# Patient Record
Sex: FEMALE | Race: WHITE | NOT HISPANIC OR LATINO | Employment: FULL TIME | URBAN - METROPOLITAN AREA
[De-identification: names, ages, dates, MRNs, and addresses within clinical notes are randomized per-mention and may not be internally consistent; named-entity substitution may affect disease eponyms.]

---

## 2017-10-25 ENCOUNTER — OFFICE VISIT (OUTPATIENT)
Dept: MEDICAL GROUP | Facility: MEDICAL CENTER | Age: 52
End: 2017-10-25
Payer: COMMERCIAL

## 2017-10-25 VITALS
BODY MASS INDEX: 27.21 KG/M2 | OXYGEN SATURATION: 98 % | DIASTOLIC BLOOD PRESSURE: 90 MMHG | TEMPERATURE: 98.2 F | RESPIRATION RATE: 14 BRPM | HEART RATE: 86 BPM | SYSTOLIC BLOOD PRESSURE: 130 MMHG | WEIGHT: 159.39 LBS | HEIGHT: 64 IN

## 2017-10-25 DIAGNOSIS — E66.3 OVERWEIGHT (BMI 25.0-29.9): ICD-10-CM

## 2017-10-25 DIAGNOSIS — R03.0 ELEVATED BP WITHOUT DIAGNOSIS OF HYPERTENSION: ICD-10-CM

## 2017-10-25 DIAGNOSIS — Z78.0 MENOPAUSE PRESENT: ICD-10-CM

## 2017-10-25 PROCEDURE — 99203 OFFICE O/P NEW LOW 30 MIN: CPT | Performed by: FAMILY MEDICINE

## 2017-10-25 ASSESSMENT — PATIENT HEALTH QUESTIONNAIRE - PHQ9: CLINICAL INTERPRETATION OF PHQ2 SCORE: 0

## 2017-10-25 NOTE — ASSESSMENT & PLAN NOTE
This is a chronic issue that has shown up more over the last year. Patient's blood pressure is the upper limits of normal at 130/90. She states that there is times where her readings are normal when she takes them out of the store and other times where they are in the similar range as what they are today. This is all possibly related to the weight gain. We will continue to monitor for now. We are doing some blood tests on her as well.

## 2017-10-25 NOTE — PROGRESS NOTES
CC: Establish care and discuss problems listed below    HISTORY OF THE PRESENT ILLNESS: Patient is a 52 y.o. female. This pleasant patient is here today to establish care and discuss chronic health problems listed below.    Health Maintenance: Patient is overdue for Pap and mammogram we will get those records from her previous provider in [place]. She does state that she had a colonoscopy in 2008.      Overweight (BMI 25.0-29.9)  This is been a chronic problem for this patient that started approximately 2 years ago. This patient had one ovary removed when she was quite young and went into menopause in her mid 40s. She notes that over the last 2 years she's put on about 20 pounds and most of its in her abdominal region. She's concerned about the weight gain and wanting to get back to weighing between 135-140. Today she weighs 159 pounds. She states that her daughter pointed out to her that she had noticed a weight gain starting at approximately the beginning of this year. Patient believes is gone on a little bit longer than that. She does try the eat healthy and does a workout through walking for an hour about 5 days a week. She is interested in what she can do to try and lose weight and maintain that weight loss.  She has tried increasing her fiber and eating better.    Menopause present  This is a chronic health problem that is well controlled with current medications and lifestyle measures.  Minimal vasomotor symptoms present currently.    Elevated BP without diagnosis of hypertension  This is a chronic issue that has shown up more over the last year. Patient's blood pressure is the upper limits of normal at 130/90. She states that there is times where her readings are normal when she takes them out of the store and other times where they are in the similar range as what they are today. This is all possibly related to the weight gain. We will continue to monitor for now. We are doing some blood tests on her as  well.      Allergies: Penicillins    No current [facility]-ordered outpatient prescriptions on file.     No current [facility]-ordered facility-administered medications on file.        Past Medical History:   Diagnosis Date   • Elevated BP without diagnosis of hypertension 10/25/2017   • Menopause present 10/25/2017    Started in mid-40s   • Overweight (BMI 25.0-29.9) 10/25/2017       Past Surgical History:   Procedure Laterality Date   • OOPHORECTOMY      done in mid-20s due to cyst       Social History   Substance Use Topics   • Smoking status: Never Smoker   • Smokeless tobacco: Never Used   • Alcohol use No       Family History   Problem Relation Age of Onset   • Hypertension Mother    • Other Sister 36     thyroid tumor       ROS:     - Constitutional: Negative for fever, chills, unexpected weight change, and fatigue/generalized weakness.     - HEENT: Negative for headaches, vision changes, hearing changes, ear pain, ear discharge, rhinorrhea, sinus congestion, sore throat, and neck pain.      - Respiratory: Negative for cough, sputum production, chest congestion, dyspnea, wheezing, and crackles.      - Cardiovascular: Negative for chest pain, palpitations, orthopnea, and bilateral lower extremity edema.     - Gastrointestinal: Negative for heartburn, nausea, vomiting, abdominal pain, hematochezia, melena, diarrhea, constipation, and greasy/foul-smelling stools.     - Genitourinary: Negative for dysuria, polyuria, hematuria, pyuria, urinary urgency, and urinary incontinence.    - Musculoskeletal: Negative for myalgias, back pain, and joint pain.     - Skin: Negative for rash, itching, cyanotic skin color change.     - Neurological: Negative for dizziness, tingling, tremors, focal sensory deficit, focal weakness and headaches.     - Endo/Heme/Allergies: Does not bruise/bleed easily.     - Psychiatric/Behavioral: Negative for depression, suicidal/homicidal ideation and memory loss.        - NOTE: All other systems reviewed  "and are negative, except as in HPI.      .      Exam: Blood pressure 130/90, pulse 86, temperature 36.8 °C (98.2 °F), resp. rate 14, height 1.626 m (5' 4\"), weight 72.3 kg (159 lb 6.3 oz), SpO2 98 %, not currently breastfeeding. Body mass index is 27.36 kg/m².    General: Normal appearing. No distress.  HEENT: Normocephalic. Eyes conjunctiva clear lids without ptosis, pupils equal and reactive to light accommodation, ears normal shape and contour, canals are clear bilaterally, tympanic membranes are benign, nasal mucosa benign, oropharynx is without erythema, edema or exudates.   Neck: Supple without JVD or bruit. Thyroid is not enlarged.  Pulmonary: Clear to ausculation.  Normal effort. No rales, ronchi, or wheezing.  Cardiovascular: Regular rate and rhythm without murmur. Carotid and radial pulses are intact and equal bilaterally.  Abdomen: Soft, nontender, nondistended. Normal bowel sounds. Liver and spleen are not palpable  Neurologic: Grossly nonfocal  Lymph: No cervical, supraclavicular or axillary lymph nodes are palpable  Skin: Warm and dry.  No obvious lesions.  Musculoskeletal: Normal gait. No extremity cyanosis, clubbing, or edema.  Psych: Normal mood and affect. Alert and oriented x3. Judgment and insight is normal.    Please note that this dictation was created using voice recognition software. I have made every reasonable attempt to correct obvious errors, but I expect that there are errors of grammar and possibly content that I did not discover before finalizing the note.      Assessment/Plan  1. Overweight (BMI 25.0-29.9)  Uncontrolled, patient has tried some programs on her own with very little success. She is interested in making certain that there is not a thyroid problem or another metabolic problem causing her weight gain. We discussed the possibility of meeting with Dr. Coreas for consultation and program development. We will make that determination after we see her blood results.  - COMP " METABOLIC PANEL; Future  - CBC WITH DIFFERENTIAL; Future  - TSH; Future    2. Menopause present  This is stable    3. Elevated BP without diagnosis of hypertension  Uncontrolled, patient will do some blood pressure readings for us. I believe if she could even lose 10 pounds we would see her blood pressure normalized. She is not in need of medication yet.

## 2017-10-25 NOTE — LETTER
October 25, 2017     Mell Danya YelenaCruz  [id number]  [place] [address]      Dear Mell:    Thank you for enrolling in [facility]. Please follow the instructions below to securely access your online medical record. [facility] allows you to send messages to your healthcare team, view certain test results, renew your prescriptions, schedule appointments, and more.     How Do I Sign Up?  1. In your Internet browser, go to  [email or web address]  2. Click on the Sign Up Now link in the Sign In box. You will see the New Member Sign Up page.  3. Enter your [facility] Access Code exactly as it appears below (case sensitive). You will not need to use this code after you’ve completed the sign-up process. If you do not sign up before the expiration date, you must request a new code.  [facility] Access Code: [email or web address]  Expires: 11/24/2017  3:54 PM    4. Enter your Email address and Date of Birth (mm/dd/yyyy) as indicated and click Submit. You will be taken to the next sign-up page.  5. Create a [facility] ID (case sensitive) . This will be your [facility] login ID and cannot be changed, so think of one that is secure and easy to remember.  6. Create a [facility]ly password  (case sensitive).   · Your password must be a length of at least 6 characters/digits.  · It must include at least 1 numeric.  · You can change your password at any time.  7. Enter your Password Reset Question and Answer. This can be used at a later time if you forget your password.   8. Enter your e-mail address. You will receive e-mail notification when new information is available in [facility].  9. Click Sign Up. You can now view your medical record.     Additional Information  Please contact [facility] Customer Support at [phone] for any questions . Remember, [facility] is NOT to be used for urgent needs. For medical emergencies, dial 911.          Introducing [facility]    [facility] ([facility] [employer]) [facility]’s Secure, Online Health Connection      [facility] now offers convenient, online access to your healthcare team and personal health information.  [facility] makes managing your healthcare easier than ever, and allows you to:  • Email your healthcare provider securely and privately  • Access your test results  • Request prescription refills 24 hours a day  • View your personal medical records from home  • Schedule or change your appointments  • View your Insurance and Billing Information  • Pay bills online ? Coming soon!        Sign below to get started:  I hereby request access to 3 day [employer] application.  I agree to abide by the [employer] Terms and Conditions, which will be provided to me upon activating my account.       __________________________________        _________________________  Name (Please Print)          Date of Birth     __________________________________       _________________________  Signature          Primary Care Provider      _______________  Date                          *For Internal Use Only: Please scan this form into the patient’s chart. Click on  - Select Patient - Attach to Encounter:  - Document Type: Consent   - Document Description: MyChart Consent

## 2017-10-25 NOTE — LETTER
[facility]  Magdalena Tinsley M.D.  [address] [place] [address]  Fax: [phone]   Authorization for Release/Disclosure of   Protected Health Information   Name: MELL LYONSNASRA : 1965 SSN: [id number]   Address: [address] [place] [address] [id number]  [place] [address] Phone:    [phone] (home)    I authorize the entity listed below to release/disclose the PHI below to:   [facility], [facility]/Magdalena Tinsley M.D. and Magdalena Tinsley M.D.   Provider or Entity Name:     Address   City, State, Zip   Phone:      Fax:     Reason for request: continuity of care   Information to be released:    [  ] LAST COLONOSCOPY,  including any PATH REPORT and follow-up  [  ] LAST FIT/COLOGUARD RESULT [  ] LAST DEXA  [  ] LAST MAMMOGRAM  [  ] LAST PAP  [  ] LAST LABS [  ] RETINA EXAM REPORT  [  ] IMMUNIZATION RECORDS  [ X ] Release all info      [  ] Check here and initial the line next to each item to release ALL health information INCLUDING  _____ Care and treatment for drug and / or alcohol abuse  _____ HIV testing, infection status, or AIDS  _____ Genetic Testing    DATES OF SERVICE OR TIME PERIOD TO BE DISCLOSED: _____________  I understand and acknowledge that:  * This Authorization may be revoked at any time by you in writing, except if your health information has already been used or disclosed.  * Your health information that will be used or disclosed as a result of you signing this authorization could be re-disclosed by the recipient. If this occurs, your re-disclosed health information may no longer be protected by State or Federal laws.  * You may refuse to sign this Authorization. Your refusal will not affect your ability to obtain treatment.  * This Authorization becomes effective upon signing and will  on (date) __________.      If no date is indicated, this Authorization will  one (1) year from the signature date.    Name: Mell SantoEliseo    Signature:   Date:     10/25/2017       PLEASE FAX REQUESTED RECORDS BACK TO: [phone]

## 2017-10-25 NOTE — ASSESSMENT & PLAN NOTE
This is been a chronic problem for this patient that started approximately 2 years ago. This patient had one ovary removed when she was quite young and went into menopause in her mid 40s. She notes that over the last 2 years she's put on about 20 pounds and most of its in her abdominal region. She's concerned about the weight gain and wanting to get back to weighing between 135-140. Today she weighs 159 pounds. She states that her daughter pointed out to her that she had noticed a weight gain starting at approximately the beginning of this year. Patient believes is gone on a little bit longer than that. She does try the eat healthy and does a workout through walking for an hour about 5 days a week. She is interested in what she can do to try and lose weight and maintain that weight loss.  She has tried increasing her fiber and eating better.

## 2017-10-25 NOTE — ASSESSMENT & PLAN NOTE
This is a chronic health problem that is well controlled with current medications and lifestyle measures.  Minimal vasomotor symptoms present currently.

## 2017-10-31 ENCOUNTER — HOSPITAL ENCOUNTER (OUTPATIENT)
Dept: LAB | Facility: MEDICAL CENTER | Age: 52
End: 2017-10-31
Attending: FAMILY MEDICINE
Payer: COMMERCIAL

## 2017-10-31 DIAGNOSIS — E66.3 OVERWEIGHT (BMI 25.0-29.9): ICD-10-CM

## 2017-10-31 LAB
ALBUMIN SERPL BCP-MCNC: 4.2 G/DL (ref 3.2–4.9)
ALBUMIN/GLOB SERPL: 1.4 G/DL
ALP SERPL-CCNC: 97 U/L (ref 30–99)
ALT SERPL-CCNC: 15 U/L (ref 2–50)
ANION GAP SERPL CALC-SCNC: 7 MMOL/L (ref 0–11.9)
AST SERPL-CCNC: 19 U/L (ref 12–45)
BASOPHILS # BLD AUTO: 0.8 % (ref 0–1.8)
BASOPHILS # BLD: 0.04 K/UL (ref 0–0.12)
BILIRUB SERPL-MCNC: 0.5 MG/DL (ref 0.1–1.5)
BUN SERPL-MCNC: 9 MG/DL (ref 8–22)
CALCIUM SERPL-MCNC: 9.5 MG/DL (ref 8.5–10.5)
CHLORIDE SERPL-SCNC: 104 MMOL/L (ref 96–112)
CO2 SERPL-SCNC: 27 MMOL/L (ref 20–33)
CREAT SERPL-MCNC: 0.66 MG/DL (ref 0.5–1.4)
EOSINOPHIL # BLD AUTO: 0.12 K/UL (ref 0–0.51)
EOSINOPHIL NFR BLD: 2.3 % (ref 0–6.9)
ERYTHROCYTE [DISTWIDTH] IN BLOOD BY AUTOMATED COUNT: 44.7 FL (ref 35.9–50)
GFR SERPL CREATININE-BSD FRML MDRD: >60 ML/MIN/1.73 M 2
GLOBULIN SER CALC-MCNC: 3 G/DL (ref 1.9–3.5)
GLUCOSE SERPL-MCNC: 87 MG/DL (ref 65–99)
HCT VFR BLD AUTO: 45 % (ref 37–47)
HGB BLD-MCNC: 14.9 G/DL (ref 12–16)
IMM GRANULOCYTES # BLD AUTO: 0.01 K/UL (ref 0–0.11)
IMM GRANULOCYTES NFR BLD AUTO: 0.2 % (ref 0–0.9)
LYMPHOCYTES # BLD AUTO: 2.14 K/UL (ref 1–4.8)
LYMPHOCYTES NFR BLD: 41 % (ref 22–41)
MCH RBC QN AUTO: 30.4 PG (ref 27–33)
MCHC RBC AUTO-ENTMCNC: 33.1 G/DL (ref 33.6–35)
MCV RBC AUTO: 91.8 FL (ref 81.4–97.8)
MONOCYTES # BLD AUTO: 0.29 K/UL (ref 0–0.85)
MONOCYTES NFR BLD AUTO: 5.6 % (ref 0–13.4)
NEUTROPHILS # BLD AUTO: 2.62 K/UL (ref 2–7.15)
NEUTROPHILS NFR BLD: 50.1 % (ref 44–72)
NRBC # BLD AUTO: 0 K/UL
NRBC BLD AUTO-RTO: 0 /100 WBC
PLATELET # BLD AUTO: 314 K/UL (ref 164–446)
PMV BLD AUTO: 10.8 FL (ref 9–12.9)
POTASSIUM SERPL-SCNC: 3.7 MMOL/L (ref 3.6–5.5)
PROT SERPL-MCNC: 7.2 G/DL (ref 6–8.2)
RBC # BLD AUTO: 4.9 M/UL (ref 4.2–5.4)
SODIUM SERPL-SCNC: 138 MMOL/L (ref 135–145)
TSH SERPL DL<=0.005 MIU/L-ACNC: 1.62 UIU/ML (ref 0.3–3.7)
WBC # BLD AUTO: 5.2 K/UL (ref 4.8–10.8)

## 2017-10-31 PROCEDURE — 80053 COMPREHEN METABOLIC PANEL: CPT

## 2017-10-31 PROCEDURE — 84443 ASSAY THYROID STIM HORMONE: CPT

## 2017-10-31 PROCEDURE — 36415 COLL VENOUS BLD VENIPUNCTURE: CPT

## 2017-10-31 PROCEDURE — 85025 COMPLETE CBC W/AUTO DIFF WBC: CPT
